# Patient Record
Sex: MALE | Race: WHITE
[De-identification: names, ages, dates, MRNs, and addresses within clinical notes are randomized per-mention and may not be internally consistent; named-entity substitution may affect disease eponyms.]

---

## 2019-11-13 ENCOUNTER — APPOINTMENT (OUTPATIENT)
Dept: SPEECH THERAPY | Facility: HOSPITAL | Age: 79
End: 2019-11-13
Payer: MEDICARE

## 2020-01-09 ENCOUNTER — OUTPATIENT (OUTPATIENT)
Dept: OUTPATIENT SERVICES | Facility: HOSPITAL | Age: 80
LOS: 1 days | End: 2020-01-09

## 2020-01-09 ENCOUNTER — APPOINTMENT (OUTPATIENT)
Dept: RADIOLOGY | Facility: HOSPITAL | Age: 80
End: 2020-01-09

## 2020-01-09 ENCOUNTER — OUTPATIENT (OUTPATIENT)
Dept: OUTPATIENT SERVICES | Facility: HOSPITAL | Age: 80
LOS: 1 days | Discharge: HOME | End: 2020-01-09

## 2020-01-09 ENCOUNTER — APPOINTMENT (OUTPATIENT)
Dept: SPEECH THERAPY | Facility: HOSPITAL | Age: 80
End: 2020-01-09

## 2020-01-09 DIAGNOSIS — Q07.9 CONGENITAL MALFORMATION OF NERVOUS SYSTEM, UNSPECIFIED: ICD-10-CM

## 2020-01-09 DIAGNOSIS — Q03.9 CONGENITAL HYDROCEPHALUS, UNSPECIFIED: ICD-10-CM

## 2020-01-09 DIAGNOSIS — R13.10 DYSPHAGIA, UNSPECIFIED: ICD-10-CM

## 2020-01-09 PROCEDURE — 74230 X-RAY XM SWLNG FUNCJ C+: CPT | Mod: 26

## 2020-01-09 NOTE — ASSESSMENT
[FreeTextEntry1] :                              MODIFIED BARIUM SWALLOW STUDY\par \par Date of Report: 1/9/20\par Date of Evaluation: 1/9/20\par Patient Name: Jg Pinto\par Date of Birth: [birth year]\par Primary Diagnosis: OroPharyngeal Dysphagia\par Treatment Diagnosis:  OroPharyngeal Dysphagia\par Referring Physician:  Konrad Duff MD\par \par Impressions/Results: \par 1.	Laryngeal penetration without complete retrieval for thin liquids and nectar thick liquids. \par 2.	No Laryngeal penetration or aspiration during trials of puree, honey thick liquids or mechanical soft solids. \par \par This 79 year old male was seen for a Modified Barium Swallow to:  rule out aspiration and assess for safest diet consistency.  \par \par Reason For Referral: To determine patient's ability to safely tolerate an oral diet.\par \par Current Nutritional Intake:\par Patient is NPO, utilizing PEG to meet nutrition/hydration/medication needs. As per son, patient has been consumed therapeutic feeds of puree and honey thick liquids during dysphagia therapy.  \par \par Medical History: Patient came today with CNA from [facility] and son, Alexandrea.  Patient is predominately [place] speaking, but does speak some English, SLP offered translation service, but son offered to translate.  Son provided patient’s medical history.  Son reports that the patient was consuming a soft/thin liquid diet back in 6/2019.  Patient had an episode of vtach in July 2019.  During hospitalization at [facility] patient had multiple modified barium swallow studies, which resulted in a recommendation of NPO and a PEG was placed.  Son reports patient has been consuming PO trials of puree and honey thick liquids at [facility] during dysphagia therapy only since September, and since September patient has not had any pneumonias or respiratory infections.  \par \par ASSESSMENT\par The patient was assessed seated in the lateral plane in the [facility], with Radiologist present.  The patient was alert, cooperative.\par Secretion management was adequate.  \par There was no coughing, throat clearing or wet/gurgly vocal quality prior to test administration. \par \par Consistencies Administered:  \par Solids:  puree, mechanical soft\par Liquids:  thin liquids, nectar thick liquids, honey thick liquids\par \par SUMMARY & IMPRESSION\par Videofluoroscoopic Evaluation reveals:\par 1.	Mild oral phase deficits for puree, thin liquids, nectar thick liquids and honey thick liquids characterized by adequate acceptance, adequate anterior bolus containment, increased lingual movement during bolus manipulation, incomplete tongue to palate contact resulting in premature spillage with honey thick liquids, adequate anterior to posterior transfer, no oral cavity residue. \par 2.	Moderate oral phase deficits for mechanical soft consistency characterized by adequate acceptance, adequate anterior bolus containment, repetitive munch chew pattern, reduced bolus collection with piece meal transfer (three swallows per trial), delayed anterior to posterior transfer, mild lingual residue in the anterior sulcus post swallow.  \par 3.	Mild pharyngeal  phase deficits for puree and honey thick liquids characterized by delayed initiation of pharyngeal swallowing triggering at the level of the vallecula. Reduced base of tongue retraction, adequate hyolaryngeal elevation, adequate epiglottic deflection, and adequate pharyngeal contraction. NO laryngeal penetration or aspiration. Trace to mild vallecula residue post swallow. \par 4.	Moderate pharyngeal phase deficits for thin liquids and nectar thick liquids characterized by delayed initiation of the pharyngeal swallow triggering at the level of the pyriforms. Reduced base of tongue retraction, reduced hyolaryngeal elevation, reduced epiglottic deflection, and adequate pharyngeal contraction. Laryngeal penetration during the swallow without complete retrieval leaving trace residue in the laryngeal vestibule above the vocal folds, but likely migrated to the level of the vocal folds as it was inconsistently followed by delayed cough response.  \par 5.	Moderate pharyngeal phase deficits for mechanical soft trials characterized by delayed initiation of the pharyngeal swallow triggering at the level of the vallecular. Reduced base of tongue retraction, reduced hyolaryngeal elevation, reduced epiglottic deflection, and reduced pharyngeal contraction. However, NO laryngeal penetration or aspiration.  Moderate vallecula residue, lateral pharyngeal wall residue and mild pyriform residue, which cleared with a liquid wash.   \par 6.	SLP and son provided directions, but patient was not able to follow directions to implement chin tuck. \par \par \par Aspiration - Penetration Scale    (Rosenbek et al Dysphagia 11:93-98 (April 1996), Aspiration-Penetration Scale)\par 1.    Material does not enter the airway, puree, honey thick liquids, mechanical soft\par 3.    Material enters the airway, remains above the vocal folds, and is not ejected, thin liquids, nectar thick liquids \par \par Recommendations:\par 1.	Puree/honey thick liquid diet consistency \par 2.	Aspiration precautions \par 3.	Safe swallow strategies: upright position for PO intake, slow rate \par 4.	Follow up with physician \par 5.	Continue dysphagia therapy at the discretion of the treating speech-language pathologist\par \par \par The above results and recommendations have been discussed with the patient and his family. All questions were answered with family demonstrating good understanding. \par \par Should you have any additional concerns, please contact the Center at [phone].\par

## 2020-01-15 DIAGNOSIS — R13.12 DYSPHAGIA, OROPHARYNGEAL PHASE: ICD-10-CM
